# Patient Record
Sex: MALE | Race: WHITE | Employment: OTHER | RURAL
[De-identification: names, ages, dates, MRNs, and addresses within clinical notes are randomized per-mention and may not be internally consistent; named-entity substitution may affect disease eponyms.]

---

## 2022-08-19 ENCOUNTER — HOSPITAL ENCOUNTER (EMERGENCY)
Facility: HOSPITAL | Age: 37
Discharge: HOME | End: 2022-08-19
Attending: FAMILY MEDICINE
Payer: MEDICAID

## 2022-08-19 VITALS
HEART RATE: 78 BPM | WEIGHT: 140 LBS | SYSTOLIC BLOOD PRESSURE: 128 MMHG | BODY MASS INDEX: 19.6 KG/M2 | OXYGEN SATURATION: 99 % | RESPIRATION RATE: 18 BRPM | TEMPERATURE: 98.4 F | DIASTOLIC BLOOD PRESSURE: 94 MMHG | HEIGHT: 71 IN

## 2022-08-19 DIAGNOSIS — S21.119A LACERATION OF CHEST WALL, UNSPECIFIED LATERALITY, INITIAL ENCOUNTER: Primary | ICD-10-CM

## 2022-08-19 PROCEDURE — 99283 EMERGENCY DEPT VISIT LOW MDM: CPT

## 2022-08-19 PROCEDURE — 6370000000 HC RX 637 (ALT 250 FOR IP): Performed by: FAMILY MEDICINE

## 2022-08-19 PROCEDURE — 12001 RPR S/N/AX/GEN/TRNK 2.5CM/<: CPT

## 2022-08-19 RX ORDER — CEPHALEXIN 500 MG/1
500 CAPSULE ORAL 3 TIMES DAILY
Qty: 21 CAPSULE | Refills: 0 | Status: SHIPPED | OUTPATIENT
Start: 2022-08-19 | End: 2022-08-26

## 2022-08-19 RX ADMIN — Medication 1 EACH: at 19:50

## 2022-08-19 ASSESSMENT — PAIN - FUNCTIONAL ASSESSMENT: PAIN_FUNCTIONAL_ASSESSMENT: NONE - DENIES PAIN

## 2022-08-19 ASSESSMENT — PAIN SCALES - GENERAL: PAINLEVEL_OUTOF10: 0

## 2022-08-19 NOTE — ED NOTES
Wound cleaned with cleansing solution and flushed with saline.       Renato Dorantes, GABRIEL  08/19/22 7027

## 2022-08-19 NOTE — ED PROVIDER NOTES
[address] [place] [address] Court  eMERGENCY dEPARTMENT eNCOUnter      Pt Name: Harjit Thayer  MRN: [id number]  [place] 1985  Date of evaluation: 8/19/2022  Provider: Hang Mercado DO    CHIEF COMPLAINT       Chief Complaint   Patient presents with    Laceration     Chest wall         HISTORY OF PRESENT ILLNESS   (Location/Symptom, Timing/Onset, Context/Setting, Quality, Duration, Modifying Factors, Severity)  Note limiting factors. Harjit Thayer is a 39 y.o. male who presents to the emergency department for having cut to his chest. Pt states that he was using a . He turned it on and something flew off the  wheel and hit him in the chest. This happened a couple hours ago. Tetanus is up to date. Bleeding controlled. Nurses got out a small piece of something flushing the area/wound. Minimal chest pain at the cut area. Nursing Notes were reviewed. REVIEW OF SYSTEMS    (2-9 systems for level 4, 10 or more forlevel 5)     Review of Systems   Skin:  Positive for wound. Cut to central mid chest   All other systems reviewed and are negative. PAST MEDICAL HISTORY   History reviewed. No pertinent past medical history. SURGICAL HISTORY     History reviewed. No pertinent surgical history. CURRENT MEDICATIONS       Previous Medications    No medications on file       ALLERGIES     Patient has no known allergies. FAMILY HISTORY     History reviewed. No pertinent family history.        SOCIAL HISTORY       Social History     Socioeconomic History    Marital status: Single     Spouse name: None    Number of children: None    Years of education: None    Highest education level: None   Tobacco Use    Smoking status: Every Day     Packs/day: 0.50     Years: 20.00     Pack years: 10.00     Types: Cigarettes    Smokeless tobacco: Never   Vaping Use    Vaping Use: Never used   Substance and Sexual Activity    Alcohol use: Not Currently    Drug use: Not Currently SCREENINGS    Love Coma Scale  Eye Opening: Spontaneous  Best Verbal Response: Oriented  Best Motor Response: Obeys commands  Love Coma Scale Score: 15        PHYSICAL EXAM    (up to 7 for level 4, 8 or more for level 5)     ED Triage Vitals   BP Temp Temp Source Heart Rate Resp SpO2 Height Weight   08/19/22 1830 08/19/22 1836 08/19/22 1836 08/19/22 1836 08/19/22 1836 08/19/22 1830 08/19/22 1834 08/19/22 1834   (!) 135/92 98.4 °F (36.9 °C) Oral 78 18 99 % 5' 11\" (1.803 m) 140 lb (63.5 kg)       Physical Exam  Vitals and nursing note reviewed. Constitutional:       General: He is not in acute distress. Appearance: Normal appearance. HENT:      Head: Normocephalic. Eyes:      Extraocular Movements: Extraocular movements intact. Pupils: Pupils are equal, round, and reactive to light. Cardiovascular:      Rate and Rhythm: Normal rate and regular rhythm. Heart sounds: Normal heart sounds. Pulmonary:      Effort: Pulmonary effort is normal.      Breath sounds: Normal breath sounds. Chest:      Chest wall: Tenderness present. Musculoskeletal:      Cervical back: Neck supple. Neurological:      Mental Status: He is alert. DIAGNOSTIC RESULTS     EKG: All EKG's are interpreted by the Emergency Department Physician who either signs or Co-signs this chart in the absence of a cardiologist.    None    RADIOLOGY:   Non-plain film images such as CT, Ultrasound and MRI are read by the radiologist. Plain radiographic images are visualized andpreliminarily interpreted by the emergency physician with the below findings:    None    Interpretationper the Radiologist below, if available at the time of this note:    No orders to display         ED BEDSIDE ULTRASOUND:   Performed by ED Physician - none    LABS:  Labs Reviewed - No data to display    All other labs were within normal range or not returned as of this dictation.     EMERGENCY DEPARTMENT COURSE and DIFFERENTIAL DIAGNOSIS/MDM: :    Vitals:    08/19/22 1830 08/19/22 1834 08/19/22 1836   BP: (!) 135/92     Pulse:   78   Resp:   18   Temp:   98.4 °F (36.9 °C)   TempSrc:   Oral   SpO2: 99%     Weight:  140 lb (63.5 kg)    Height:  5' 11\" (1.803 m)            CRITICAL CARE TIME   Total Critical Care time was 0 minutes, excluding separatelyreportable procedures. There was a high probability ofclinically significant/life threatening deterioration in the patient's condition which required my urgent intervention. CONSULTS:  None    PROCEDURES:  Lac Repair    Date/Time: 8/19/2022 8:12 PM  Performed by: Abimbola Roger DO  Authorized by: Abimbola Roger DO     Consent:     Consent obtained:  Verbal    Consent given by:  Patient    Risks discussed:  Infection, pain and poor cosmetic result  Universal protocol:     Patient identity confirmed:  Verbally with patient  Anesthesia:     Anesthesia method:  None  Laceration details:     Location:  Trunk    Trunk location: Central chest.    Length (cm):  2    Depth (mm):  2  Pre-procedure details:     Preparation:  Patient was prepped and draped in usual sterile fashion  Exploration:     Hemostasis achieved with:  Direct pressure    Foreign body noted: Small superficial foreign body removed with flushing of skin. Treatment:     Area cleansed with:  Chlorhexidine and saline    Amount of cleaning:  Standard    Irrigation solution:  Sterile water    Visualized foreign bodies/material removed: yes (Removed by nursing staff with flushing)    Skin repair:     Repair method:  Steri-Strips and tissue adhesive    Number of Steri-Strips:  6  Approximation:     Approximation:  Close  Repair type:     Repair type:  Simple  Post-procedure details:     Dressing:  Sterile dressing    Procedure completion:  Tolerated well, no immediate complications      PROGRESS NOTES:    Pt with laceration to chest. Discussed probable need for stitches. Pt didn't want stitches. Afraid of needles.  Will steristrip and glue the area

## 2022-08-19 NOTE — ED TRIAGE NOTES
Pt arrives to ED with a c/o laceration to the chest wall. Pt was using a  when a rock cut his chest. Pt denies LOC, SOB, or CP. Pt states the incident happed around 3 hours piror to coming to the ED.

## 2024-04-12 ENCOUNTER — HOSPITAL ENCOUNTER (EMERGENCY)
Facility: HOSPITAL | Age: 39
Discharge: ANOTHER HOSPITAL | End: 2024-04-13
Attending: EMERGENCY MEDICINE
Payer: MEDICAID

## 2024-04-12 VITALS
HEART RATE: 89 BPM | WEIGHT: 126 LBS | BODY MASS INDEX: 17.64 KG/M2 | TEMPERATURE: 98.2 F | RESPIRATION RATE: 18 BRPM | HEIGHT: 71 IN | OXYGEN SATURATION: 97 %

## 2024-04-12 DIAGNOSIS — T15.91XA FOREIGN BODY OF RIGHT EYE, INITIAL ENCOUNTER: Primary | ICD-10-CM

## 2024-04-12 PROCEDURE — 99285 EMERGENCY DEPT VISIT HI MDM: CPT

## 2024-04-12 ASSESSMENT — LIFESTYLE VARIABLES
HOW MANY STANDARD DRINKS CONTAINING ALCOHOL DO YOU HAVE ON A TYPICAL DAY: PATIENT DOES NOT DRINK
HOW OFTEN DO YOU HAVE A DRINK CONTAINING ALCOHOL: NEVER

## 2024-04-13 NOTE — ED PROVIDER NOTES
.        [facility]  EMERGENCY DEPARTMENT ENCOUNTER        Pt Name: Venkat Lanier  MRN: [id number]  Birthdate 1985  Date of evaluation: 4/12/2024  Provider: Jennifer Wan MD  PCP: Nemo Javed APRN - CNP  Note Started: 11:55 PM EDT 4/12/24    CHIEF COMPLAINT       Chief Complaint   Patient presents with    Foreign Body in Eye     Pt. Was grinding metal on a car,has a fb in right eye.       HISTORY OF PRESENT ILLNESS: 1 or more Elements     History from : Patient    Limitations to history : None    Venkat Lanier is a 38 y.o. male who presents complaining he was grinding metal without protective glasses on yesterday when a piece of metal flew into his eye it is the right eye his vision is good he is 20/20 in each eye separately 2013 and both together he complains of pain irritation to that eye he states when he looked in the mirror he can see a visible black dot over his iris.  Patient does not wear glasses or contacts.    Nursing Notes were all reviewed and agreed with or any disagreements were addressed in the HPI.    REVIEW OF SYSTEMS :      Review of Systems     systems reviewed and negative except as in HPI/MDM    SURGICAL HISTORY   No past surgical history on file.    CURRENTMEDICATIONS       Previous Medications    No medications on file       ALLERGIES     Patient has no known allergies.    FAMILYHISTORY     No family history on file.     SOCIAL HISTORY       Social History     Tobacco Use    Smoking status: Every Day     Current packs/day: 0.50     Average packs/day: 0.5 packs/day for 20.0 years (10.0 ttl pk-yrs)     Types: Cigarettes    Smokeless tobacco: Never   Vaping Use    Vaping Use: Never used   Substance Use Topics    Alcohol use: Not Currently    Drug use: Not Currently       SCREENINGS        Love Coma Scale  Eye Opening: Spontaneous  Best Verbal Response: Oriented  Best Motor Response: Obeys commands  Love Coma Scale Score: 15                CIWA  SEP-1 Core Measure due to severe sepsis or septic shock?   No   Exclusion criteria - the patient is NOT to be included for SEP-1 Core Measure due to:  Infection is not suspected    PAST MEDICAL HISTORY      has no past medical history on file.     CC/HPI Summary, DDx, ED Course, and Reassessment: 2359    Patient tolerated the attempt at removal.  Call was placed to  and attempt to transfer patient for evaluation by ophthalmology.  We are currently awaiting a callback.    CONSULTS: (Who and What was discussed)  None    Discussion with Other Profesionals : Consultant spoke with the  possible ophthalmology evaluation he is excepted the patient in transfer to [facility]        Chronic Conditions: Hep C        Disposition Considerations (include 1 Tests not done, Shared Decision Making, Pt Expectation of Test or Tx.):         I am the Primary Clinician of Record.    FINAL IMPRESSION      1. Foreign body of right eye, initial encounter          DISPOSITION/PLAN     DISPOSITION Decision To Transfer 04/13/2024 12:24:22 AM  Transfer to [facility]    PATIENT REFERRED TO:  No follow-up provider specified.    DISCHARGE MEDICATIONS:  New Prescriptions    No medications on file       DISCONTINUED MEDICATIONS:  Discontinued Medications    No medications on file              (Please note that portions of this note were completed with a voice recognition program.  Efforts were made to edit the dictations but occasionally words are mis-transcribed.)    Jennifer Wan MD (electronically signed)           Jennifer Wan MD  04/13/24 0026

## 2024-06-18 ENCOUNTER — HOSPITAL ENCOUNTER (EMERGENCY)
Facility: HOSPITAL | Age: 39
Discharge: HOME | End: 2024-06-18
Attending: EMERGENCY MEDICINE
Payer: MEDICAID

## 2024-06-18 VITALS
OXYGEN SATURATION: 99 % | HEART RATE: 80 BPM | SYSTOLIC BLOOD PRESSURE: 139 MMHG | DIASTOLIC BLOOD PRESSURE: 79 MMHG | TEMPERATURE: 98.1 F | RESPIRATION RATE: 10 BRPM | WEIGHT: 126 LBS | BODY MASS INDEX: 17.57 KG/M2

## 2024-06-18 DIAGNOSIS — R07.9 CHEST PAIN, UNSPECIFIED TYPE: Primary | ICD-10-CM

## 2024-06-18 LAB
ANION GAP SERPL CALCULATED.3IONS-SCNC: 11 MMOL/L (ref 3–16)
BASOPHILS # BLD: 0.1 K/UL (ref 0–0.1)
BASOPHILS NFR BLD: 0.7 %
BUN SERPL-MCNC: 17 MG/DL (ref 6–20)
CALCIUM SERPL-MCNC: 9.2 MG/DL (ref 8.5–10.5)
CHLORIDE SERPL-SCNC: 97 MMOL/L (ref 98–107)
CK SERPL-CCNC: 198 U/L (ref 26–174)
CO2 SERPL-SCNC: 29 MMOL/L (ref 20–30)
CREAT SERPL-MCNC: 0.8 MG/DL (ref 0.4–1.2)
EOSINOPHIL # BLD: 0.2 K/UL (ref 0–0.4)
EOSINOPHIL NFR BLD: 2 %
ERYTHROCYTE [DISTWIDTH] IN BLOOD BY AUTOMATED COUNT: 11.8 % (ref 11–16)
GFR SERPLBLD CREATININE-BSD FMLA CKD-EPI: >90 ML/MIN/{1.73_M2}
GLUCOSE SERPL-MCNC: 114 MG/DL (ref 74–106)
HCT VFR BLD AUTO: 40.6 % (ref 40–54)
HGB BLD-MCNC: 14 G/DL (ref 13–18)
IMM GRANULOCYTES # BLD: 0 K/UL
IMM GRANULOCYTES NFR BLD: 0.2 % (ref 0–5)
LYMPHOCYTES # BLD: 2.2 K/UL (ref 1.5–4)
LYMPHOCYTES NFR BLD: 21.2 %
MCH RBC QN AUTO: 31.3 PG (ref 27–32)
MCHC RBC AUTO-ENTMCNC: 34.5 G/DL (ref 31–35)
MCV RBC AUTO: 90.6 FL (ref 80–100)
MONOCYTES # BLD: 0.8 K/UL (ref 0.2–0.8)
MONOCYTES NFR BLD: 7.5 %
NEUTROPHILS # BLD: 7.2 K/UL (ref 2–7.5)
NEUTS SEG NFR BLD: 68.4 %
PLATELET # BLD AUTO: 192 K/UL (ref 150–400)
PMV BLD AUTO: 9.8 FL (ref 6–10)
POTASSIUM SERPL-SCNC: 3.4 MMOL/L (ref 3.4–5.1)
RBC # BLD AUTO: 4.48 M/UL (ref 4.5–6)
SODIUM SERPL-SCNC: 137 MMOL/L (ref 136–145)
TROPONIN, HIGH SENSITIVITY: 7 NG/L (ref 0–22)
TROPONIN, HIGH SENSITIVITY: 8 NG/L (ref 0–22)
WBC # BLD AUTO: 10.5 K/UL (ref 4–11)

## 2024-06-18 PROCEDURE — 82550 ASSAY OF CK (CPK): CPT

## 2024-06-18 PROCEDURE — 99284 EMERGENCY DEPT VISIT MOD MDM: CPT

## 2024-06-18 PROCEDURE — 36415 COLL VENOUS BLD VENIPUNCTURE: CPT

## 2024-06-18 PROCEDURE — 80048 BASIC METABOLIC PNL TOTAL CA: CPT

## 2024-06-18 PROCEDURE — 84484 ASSAY OF TROPONIN QUANT: CPT

## 2024-06-18 PROCEDURE — 85025 COMPLETE CBC W/AUTO DIFF WBC: CPT

## 2024-06-19 NOTE — ED PROVIDER NOTES
[facility]  EMERGENCY DEPARTMENT ENCOUNTER        Pt Name: Venkat Lanier  MRN: [id number]  Birthdate 1985  Date of evaluation: 6/18/2024  Provider: Lianne Ellis MD  PCP: Nemo Javed APRN - CNP  Note Started: 8:21 PM EDT 6/18/24    CHIEF COMPLAINT       Chief Complaint   Patient presents with    Chest Pain    Dizziness       HISTORY OF PRESENT ILLNESS: 1 or more Elements     History from : Patient    Limitations to history : None    Venkat Lanier is a 38 y.o. male who presents to the emergency department with dizziness, lightheadedness and chest pain.  Patient states that he is a  and he works all.  It is above 95 degrees here with high humidity.  He states he drinks pop all day but did not drink any water.  He states when he got home about 40 minutes ago he felt dizzy and lightheaded and had the chest pain when he got out of the car.  On the drive over to the hospital he states that his significant other stopped and got him some water and he is starting to feel better though he still having some chest pressure.  He denies history of hypertension, diabetes, high cholesterol, CAD, family history of CAD.  He is a daily smoker.    Nursing Notes were all reviewed and agreed with or any disagreements were addressed in the HPI.    REVIEW OF SYSTEMS :      Review of Systems    10 systems reviewed and negative except as in HPI/MDM    SURGICAL HISTORY   History reviewed. No pertinent surgical history.    CURRENTMEDICATIONS       Previous Medications    No medications on file       ALLERGIES     Patient has no known allergies.    FAMILYHISTORY     History reviewed. No pertinent family history.     SOCIAL HISTORY       Social History     Tobacco Use    Smoking status: Every Day     Current packs/day: 0.50     Average packs/day: 0.5 packs/day for 20.0 years (10.0 ttl pk-yrs)     Types: Cigarettes    Smokeless tobacco: Never   Vaping Use    Vaping Use: Never used

## 2025-02-15 ENCOUNTER — HOSPITAL ENCOUNTER (EMERGENCY)
Facility: HOSPITAL | Age: 40
Discharge: HOME | End: 2025-02-15
Attending: FAMILY MEDICINE
Payer: MEDICAID

## 2025-02-15 ENCOUNTER — APPOINTMENT (OUTPATIENT)
Dept: GENERAL RADIOLOGY | Facility: HOSPITAL | Age: 40
End: 2025-02-15
Attending: FAMILY MEDICINE
Payer: MEDICAID

## 2025-02-15 VITALS
TEMPERATURE: 99.1 F | DIASTOLIC BLOOD PRESSURE: 90 MMHG | OXYGEN SATURATION: 96 % | SYSTOLIC BLOOD PRESSURE: 139 MMHG | RESPIRATION RATE: 16 BRPM | WEIGHT: 136 LBS | BODY MASS INDEX: 19.04 KG/M2 | HEIGHT: 71 IN | HEART RATE: 89 BPM

## 2025-02-15 DIAGNOSIS — R11.2 NAUSEA AND VOMITING, UNSPECIFIED VOMITING TYPE: Primary | ICD-10-CM

## 2025-02-15 DIAGNOSIS — K59.09 OTHER CONSTIPATION: ICD-10-CM

## 2025-02-15 LAB
ALBUMIN SERPL-MCNC: 4.5 G/DL (ref 3.4–4.8)
ALBUMIN/GLOB SERPL: 1.3 {RATIO} (ref 0.8–2)
ALP SERPL-CCNC: 163 U/L (ref 25–100)
ALT SERPL-CCNC: 31 U/L (ref 4–36)
ANION GAP SERPL CALCULATED.3IONS-SCNC: 14 MMOL/L (ref 3–16)
AST SERPL-CCNC: 37 U/L (ref 8–33)
BASOPHILS # BLD: 0 K/UL (ref 0–0.1)
BASOPHILS NFR BLD: 0.1 %
BILIRUB SERPL-MCNC: 0.3 MG/DL (ref 0.3–1.2)
BUN SERPL-MCNC: 17 MG/DL (ref 6–20)
CALCIUM SERPL-MCNC: 9.3 MG/DL (ref 8.3–10.6)
CHLORIDE SERPL-SCNC: 87 MMOL/L (ref 98–107)
CO2 SERPL-SCNC: 32 MMOL/L (ref 20–30)
CREAT SERPL-MCNC: 0.8 MG/DL (ref 0.4–1.2)
EOSINOPHIL # BLD: 0 K/UL (ref 0–0.4)
EOSINOPHIL NFR BLD: 0 %
ERYTHROCYTE [DISTWIDTH] IN BLOOD BY AUTOMATED COUNT: 11.7 % (ref 11–16)
GFR SERPLBLD CREATININE-BSD FMLA CKD-EPI: >90 ML/MIN/{1.73_M2}
GLOBULIN SER CALC-MCNC: 3.4 G/DL
GLUCOSE SERPL-MCNC: 149 MG/DL (ref 74–106)
HCT VFR BLD AUTO: 45.4 % (ref 40–54)
HGB BLD-MCNC: 15.9 G/DL (ref 13–18)
IMM GRANULOCYTES # BLD: 0 K/UL
IMM GRANULOCYTES NFR BLD: 0.3 % (ref 0–5)
LIPASE SERPL-CCNC: 18 U/L (ref 5.6–51.3)
LYMPHOCYTES # BLD: 0.5 K/UL (ref 1.5–4)
LYMPHOCYTES NFR BLD: 5.6 %
MCH RBC QN AUTO: 30.9 PG (ref 27–32)
MCHC RBC AUTO-ENTMCNC: 35 G/DL (ref 31–35)
MCV RBC AUTO: 88.2 FL (ref 80–100)
MONOCYTES # BLD: 1 K/UL (ref 0.2–0.8)
MONOCYTES NFR BLD: 11.6 %
NEUTROPHILS # BLD: 7.2 K/UL (ref 2–7.5)
NEUTS SEG NFR BLD: 82.4 %
PLATELET # BLD AUTO: 160 K/UL (ref 150–400)
PMV BLD AUTO: 10.2 FL (ref 6–10)
POTASSIUM SERPL-SCNC: 3.5 MMOL/L (ref 3.4–5.1)
PROT SERPL-MCNC: 7.9 G/DL (ref 6.4–8.3)
RBC # BLD AUTO: 5.15 M/UL (ref 4.5–6)
SODIUM SERPL-SCNC: 133 MMOL/L (ref 136–145)
WBC # BLD AUTO: 8.8 K/UL (ref 4–11)

## 2025-02-15 PROCEDURE — 99284 EMERGENCY DEPT VISIT MOD MDM: CPT

## 2025-02-15 PROCEDURE — 96361 HYDRATE IV INFUSION ADD-ON: CPT

## 2025-02-15 PROCEDURE — 2580000003 HC RX 258: Performed by: FAMILY MEDICINE

## 2025-02-15 PROCEDURE — 85025 COMPLETE CBC W/AUTO DIFF WBC: CPT

## 2025-02-15 PROCEDURE — 80053 COMPREHEN METABOLIC PANEL: CPT

## 2025-02-15 PROCEDURE — 83690 ASSAY OF LIPASE: CPT

## 2025-02-15 PROCEDURE — 74018 RADEX ABDOMEN 1 VIEW: CPT

## 2025-02-15 PROCEDURE — 36415 COLL VENOUS BLD VENIPUNCTURE: CPT

## 2025-02-15 PROCEDURE — 96374 THER/PROPH/DIAG INJ IV PUSH: CPT

## 2025-02-15 PROCEDURE — 6360000002 HC RX W HCPCS: Performed by: FAMILY MEDICINE

## 2025-02-15 RX ORDER — ONDANSETRON 2 MG/ML
4 INJECTION INTRAMUSCULAR; INTRAVENOUS ONCE
Status: COMPLETED | OUTPATIENT
Start: 2025-02-15 | End: 2025-02-15

## 2025-02-15 RX ORDER — ONDANSETRON 4 MG/1
4 TABLET, FILM COATED ORAL 3 TIMES DAILY PRN
Qty: 15 TABLET | Refills: 0 | Status: SHIPPED | OUTPATIENT
Start: 2025-02-15

## 2025-02-15 RX ORDER — 0.9 % SODIUM CHLORIDE 0.9 %
1000 INTRAVENOUS SOLUTION INTRAVENOUS ONCE
Status: COMPLETED | OUTPATIENT
Start: 2025-02-15 | End: 2025-02-15

## 2025-02-15 RX ADMIN — SODIUM CHLORIDE 1000 ML: 9 INJECTION, SOLUTION INTRAVENOUS at 05:48

## 2025-02-15 RX ADMIN — ONDANSETRON 4 MG: 2 INJECTION INTRAMUSCULAR; INTRAVENOUS at 05:48

## 2025-02-15 NOTE — ED PROVIDER NOTES
[facility]  EMERGENCY DEPARTMENT ENCOUNTER        Pt Name: Venkat Lanier  MRN: [id number]  Birthdate 1985  Date of evaluation: 2/15/2025  Provider: Saleem Dixon MD  PCP: Nemo Javed APRN - CNP  Note Started: 5:07 AM EST 2/15/25    CHIEF COMPLAINT       Chief Complaint   Patient presents with    Constipation       HISTORY OF PRESENT ILLNESS: 1 or more Elements     History from : Patient    Limitations to history : None    Venkat Lanier is a 39 y.o. male who presents here today because of nausea and vomiting and lack of bowel movement for the past 2 days.  Denies any fever chills or sweating.  Patient said that he had a flu few days ago and he was taking Tylenol round-the-clock to break the fluid.  Patient said that he cannot keep anything down.    Nursing Notes were all reviewed and agreed with or any disagreements were addressed in the HPI.    REVIEW OF SYSTEMS :      Review of Systems     systems reviewed and negative except as in HPI/MDM    PAST MEDICAL HISTORY     Past Medical History:   Diagnosis Date    Hepatitis C        SURGICAL HISTORY   History reviewed. No pertinent surgical history.    CURRENTMEDICATIONS       Previous Medications    No medications on file       ALLERGIES     Patient has no known allergies.    FAMILYHISTORY     History reviewed. No pertinent family history.     SOCIAL HISTORY       Social History     Tobacco Use    Smoking status: Every Day     Current packs/day: 0.50     Average packs/day: 0.5 packs/day for 20.0 years (10.0 ttl pk-yrs)     Types: Cigarettes    Smokeless tobacco: Never   Vaping Use    Vaping status: Never Used   Substance Use Topics    Alcohol use: Not Currently    Drug use: Not Currently       SCREENINGS        [place] Coma Scale  Eye Opening: Spontaneous  Best Verbal Response: Oriented  Best Motor Response: Obeys commands  Love Coma Scale Score: 15                CIWA Assessment  BP: (!) 139/90  Pulse: 89        Helical Rim Advancement Flap Text: The defect edges were debeveled with a #15 blade scalpel.  Given the location of the defect and the proximity to free margins (helical rim) a double helical rim advancement flap was deemed most appropriate.  Using a sterile surgical marker, the appropriate advancement flaps were drawn incorporating the defect and placing the expected incisions between the helical rim and antihelix where possible.  The area thus outlined was incised through and through with a #15 scalpel blade.  With a skin hook and iris scissors, the flaps were gently and sharply undermined and freed up.

## 2025-02-15 NOTE — DISCHARGE INSTRUCTIONS
Results were discussed with patient.  Advised to follow the primary care in 2 days.  Come back to the ER symptoms get worse